# Patient Record
Sex: FEMALE | Race: BLACK OR AFRICAN AMERICAN | NOT HISPANIC OR LATINO | Employment: OTHER | URBAN - METROPOLITAN AREA
[De-identification: names, ages, dates, MRNs, and addresses within clinical notes are randomized per-mention and may not be internally consistent; named-entity substitution may affect disease eponyms.]

---

## 2020-12-24 LAB — BCS RECOMMENDATION EXT: NORMAL

## 2021-05-20 LAB — CRC RECOMMENDATION EXT: NORMAL

## 2022-04-05 ENCOUNTER — OUTSIDE PLACE OF SERVICE (OUTPATIENT)
Dept: NEPHROLOGY | Facility: CLINIC | Age: 72
End: 2022-04-05

## 2022-04-12 ENCOUNTER — OUTSIDE PLACE OF SERVICE (OUTPATIENT)
Dept: NEPHROLOGY | Facility: CLINIC | Age: 72
End: 2022-04-12

## 2022-04-19 ENCOUNTER — OUTSIDE PLACE OF SERVICE (OUTPATIENT)
Dept: NEPHROLOGY | Facility: CLINIC | Age: 72
End: 2022-04-19

## 2022-04-26 ENCOUNTER — OUTSIDE PLACE OF SERVICE (OUTPATIENT)
Dept: NEPHROLOGY | Facility: CLINIC | Age: 72
End: 2022-04-26

## 2022-05-01 PROCEDURE — 90960 PR ESRD SERVICES, PER MONTH, 20+ YR OLD, 4+ VISITS: ICD-10-PCS | Mod: ,,, | Performed by: INTERNAL MEDICINE

## 2022-05-01 PROCEDURE — 90960 ESRD SRV 4 VISITS P MO 20+: CPT | Mod: ,,, | Performed by: INTERNAL MEDICINE

## 2022-05-03 ENCOUNTER — OUTSIDE PLACE OF SERVICE (OUTPATIENT)
Dept: NEPHROLOGY | Facility: CLINIC | Age: 72
End: 2022-05-03
Payer: MEDICARE

## 2022-06-01 ENCOUNTER — OUTSIDE PLACE OF SERVICE (OUTPATIENT)
Dept: NEPHROLOGY | Facility: CLINIC | Age: 72
End: 2022-06-01
Payer: MEDICARE

## 2022-06-01 PROCEDURE — 90961 PR ESRD SERVICES, PER MONTH, 20+ YR OLD, 2-3 VISITS: ICD-10-PCS | Mod: ,,, | Performed by: INTERNAL MEDICINE

## 2022-06-01 PROCEDURE — 90961 ESRD SRV 2-3 VSTS P MO 20+: CPT | Mod: ,,, | Performed by: INTERNAL MEDICINE

## 2022-07-01 ENCOUNTER — OUTSIDE PLACE OF SERVICE (OUTPATIENT)
Dept: NEPHROLOGY | Facility: CLINIC | Age: 72
End: 2022-07-01
Payer: MEDICARE

## 2022-07-01 PROCEDURE — 90960 PR ESRD SERVICES, PER MONTH, 20+ YR OLD, 4+ VISITS: ICD-10-PCS | Mod: ,,, | Performed by: INTERNAL MEDICINE

## 2022-07-01 PROCEDURE — 90960 ESRD SRV 4 VISITS P MO 20+: CPT | Mod: ,,, | Performed by: INTERNAL MEDICINE

## 2022-08-01 ENCOUNTER — OUTSIDE PLACE OF SERVICE (OUTPATIENT)
Dept: NEPHROLOGY | Facility: CLINIC | Age: 72
End: 2022-08-01
Payer: MEDICARE

## 2022-08-01 PROCEDURE — 90960 PR ESRD SERVICES, PER MONTH, 20+ YR OLD, 4+ VISITS: ICD-10-PCS | Mod: ,,, | Performed by: INTERNAL MEDICINE

## 2022-08-01 PROCEDURE — 90960 ESRD SRV 4 VISITS P MO 20+: CPT | Mod: ,,, | Performed by: INTERNAL MEDICINE

## 2022-09-01 ENCOUNTER — OUTSIDE PLACE OF SERVICE (OUTPATIENT)
Dept: NEPHROLOGY | Facility: CLINIC | Age: 72
End: 2022-09-01
Payer: MEDICARE

## 2022-09-01 PROCEDURE — 90961 PR ESRD SERVICES, PER MONTH, 20+ YR OLD, 2-3 VISITS: ICD-10-PCS | Mod: ,,, | Performed by: INTERNAL MEDICINE

## 2022-09-01 PROCEDURE — 90961 ESRD SRV 2-3 VSTS P MO 20+: CPT | Mod: ,,, | Performed by: INTERNAL MEDICINE

## 2022-10-01 ENCOUNTER — OUTSIDE PLACE OF SERVICE (OUTPATIENT)
Dept: NEPHROLOGY | Facility: CLINIC | Age: 72
End: 2022-10-01
Payer: MEDICARE

## 2022-10-01 PROCEDURE — 90960 ESRD SRV 4 VISITS P MO 20+: CPT | Mod: ,,, | Performed by: INTERNAL MEDICINE

## 2022-10-01 PROCEDURE — 90960 PR ESRD SERVICES, PER MONTH, 20+ YR OLD, 4+ VISITS: ICD-10-PCS | Mod: ,,, | Performed by: INTERNAL MEDICINE

## 2022-10-05 ENCOUNTER — PATIENT OUTREACH (OUTPATIENT)
Dept: ADMINISTRATIVE | Facility: HOSPITAL | Age: 72
End: 2022-10-05
Payer: MEDICARE

## 2022-10-05 NOTE — PROGRESS NOTES
Population Health Outreach.Records Received, hyper-linked into chart at this time. The following record(s)  below were uploaded for Health Maintenance .    12/24/2020-MAMMOGRAM  5/20/2021-colonoscopy

## 2022-10-13 RX ORDER — HYDRALAZINE HYDROCHLORIDE 50 MG/1
TABLET, FILM COATED ORAL
Qty: 270 TABLET | Refills: 3 | Status: SHIPPED | OUTPATIENT
Start: 2022-10-13

## 2022-11-01 ENCOUNTER — OUTSIDE PLACE OF SERVICE (OUTPATIENT)
Dept: NEPHROLOGY | Facility: CLINIC | Age: 72
End: 2022-11-01
Payer: MEDICARE

## 2022-11-01 PROCEDURE — 90960 ESRD SRV 4 VISITS P MO 20+: CPT | Mod: ,,, | Performed by: INTERNAL MEDICINE

## 2022-11-01 PROCEDURE — 90960 PR ESRD SERVICES, PER MONTH, 20+ YR OLD, 4+ VISITS: ICD-10-PCS | Mod: ,,, | Performed by: INTERNAL MEDICINE

## 2022-12-01 ENCOUNTER — OUTSIDE PLACE OF SERVICE (OUTPATIENT)
Dept: NEPHROLOGY | Facility: CLINIC | Age: 72
End: 2022-12-01
Payer: MEDICARE

## 2022-12-01 PROCEDURE — 90961 ESRD SRV 2-3 VSTS P MO 20+: CPT | Mod: ,,, | Performed by: INTERNAL MEDICINE

## 2022-12-01 PROCEDURE — 90961 PR ESRD SERVICES, PER MONTH, 20+ YR OLD, 2-3 VISITS: ICD-10-PCS | Mod: ,,, | Performed by: INTERNAL MEDICINE

## 2023-01-01 ENCOUNTER — OUTSIDE PLACE OF SERVICE (OUTPATIENT)
Dept: NEPHROLOGY | Facility: CLINIC | Age: 73
End: 2023-01-01
Payer: MEDICARE

## 2023-01-01 PROCEDURE — 90961 ESRD SRV 2-3 VSTS P MO 20+: CPT | Mod: ,,, | Performed by: INTERNAL MEDICINE

## 2023-01-01 PROCEDURE — 90961 PR ESRD SERVICES, PER MONTH, 20+ YR OLD, 2-3 VISITS: ICD-10-PCS | Mod: ,,, | Performed by: INTERNAL MEDICINE

## 2023-01-09 DIAGNOSIS — T80.90XA: ICD-10-CM

## 2023-01-09 DIAGNOSIS — Z99.2 HEMODIALYSIS PATIENT: Primary | ICD-10-CM

## 2023-02-01 ENCOUNTER — OUTSIDE PLACE OF SERVICE (OUTPATIENT)
Dept: NEPHROLOGY | Facility: CLINIC | Age: 73
End: 2023-02-01
Payer: MEDICARE

## 2023-02-01 PROCEDURE — 90960 ESRD SRV 4 VISITS P MO 20+: CPT | Mod: ,,, | Performed by: INTERNAL MEDICINE

## 2023-02-01 PROCEDURE — 90960 PR ESRD SERVICES, PER MONTH, 20+ YR OLD, 4+ VISITS: ICD-10-PCS | Mod: ,,, | Performed by: INTERNAL MEDICINE

## 2023-03-01 ENCOUNTER — OUTSIDE PLACE OF SERVICE (OUTPATIENT)
Dept: NEPHROLOGY | Facility: CLINIC | Age: 73
End: 2023-03-01
Payer: MEDICARE

## 2023-03-01 PROCEDURE — 90961 ESRD SRV 2-3 VSTS P MO 20+: CPT | Mod: ,,, | Performed by: INTERNAL MEDICINE

## 2023-03-01 PROCEDURE — 90961 PR ESRD SERVICES, PER MONTH, 20+ YR OLD, 2-3 VISITS: ICD-10-PCS | Mod: ,,, | Performed by: INTERNAL MEDICINE

## 2023-04-01 ENCOUNTER — OUTSIDE PLACE OF SERVICE (OUTPATIENT)
Dept: NEPHROLOGY | Facility: CLINIC | Age: 73
End: 2023-04-01
Payer: MEDICARE

## 2023-04-01 PROCEDURE — 90960 PR ESRD SERVICES, PER MONTH, 20+ YR OLD, 4+ VISITS: ICD-10-PCS | Mod: ,,, | Performed by: INTERNAL MEDICINE

## 2023-04-01 PROCEDURE — 90960 ESRD SRV 4 VISITS P MO 20+: CPT | Mod: ,,, | Performed by: INTERNAL MEDICINE

## 2023-05-01 ENCOUNTER — OUTSIDE PLACE OF SERVICE (OUTPATIENT)
Dept: NEPHROLOGY | Facility: CLINIC | Age: 73
End: 2023-05-01
Payer: MEDICARE

## 2023-05-01 PROCEDURE — 90960 PR ESRD SERVICES, PER MONTH, 20+ YR OLD, 4+ VISITS: ICD-10-PCS | Mod: ,,, | Performed by: INTERNAL MEDICINE

## 2023-05-01 PROCEDURE — 90960 ESRD SRV 4 VISITS P MO 20+: CPT | Mod: ,,, | Performed by: INTERNAL MEDICINE

## 2023-06-01 ENCOUNTER — OUTSIDE PLACE OF SERVICE (OUTPATIENT)
Dept: NEPHROLOGY | Facility: CLINIC | Age: 73
End: 2023-06-01
Payer: MEDICARE

## 2023-06-01 PROCEDURE — 90960 PR ESRD SERVICES, PER MONTH, 20+ YR OLD, 4+ VISITS: ICD-10-PCS | Mod: ,,, | Performed by: INTERNAL MEDICINE

## 2023-06-01 PROCEDURE — 90960 ESRD SRV 4 VISITS P MO 20+: CPT | Mod: ,,, | Performed by: INTERNAL MEDICINE

## 2023-07-01 ENCOUNTER — OUTSIDE PLACE OF SERVICE (OUTPATIENT)
Dept: NEPHROLOGY | Facility: CLINIC | Age: 73
End: 2023-07-01
Payer: MEDICARE

## 2023-07-01 PROCEDURE — 90960 PR ESRD SERVICES, PER MONTH, 20+ YR OLD, 4+ VISITS: ICD-10-PCS | Mod: ,,, | Performed by: INTERNAL MEDICINE

## 2023-07-01 PROCEDURE — 90960 ESRD SRV 4 VISITS P MO 20+: CPT | Mod: ,,, | Performed by: INTERNAL MEDICINE

## 2023-07-01 PROCEDURE — 99222 1ST HOSP IP/OBS MODERATE 55: CPT | Mod: ,,, | Performed by: INTERNAL MEDICINE

## 2023-07-01 PROCEDURE — 99222 PR INITIAL HOSPITAL CARE,LEVL II: ICD-10-PCS | Mod: ,,, | Performed by: INTERNAL MEDICINE

## 2023-07-02 PROCEDURE — 99232 SBSQ HOSP IP/OBS MODERATE 35: CPT | Mod: ,,, | Performed by: INTERNAL MEDICINE

## 2023-07-02 PROCEDURE — 99232 PR SUBSEQUENT HOSPITAL CARE,LEVL II: ICD-10-PCS | Mod: ,,, | Performed by: INTERNAL MEDICINE

## 2023-07-03 PROCEDURE — 99232 SBSQ HOSP IP/OBS MODERATE 35: CPT | Mod: ,,, | Performed by: INTERNAL MEDICINE

## 2023-07-03 PROCEDURE — 99232 PR SUBSEQUENT HOSPITAL CARE,LEVL II: ICD-10-PCS | Mod: ,,, | Performed by: INTERNAL MEDICINE

## 2023-08-01 ENCOUNTER — OUTSIDE PLACE OF SERVICE (OUTPATIENT)
Dept: NEPHROLOGY | Facility: CLINIC | Age: 73
End: 2023-08-01
Payer: MEDICARE

## 2023-08-01 PROCEDURE — 90960 ESRD SRV 4 VISITS P MO 20+: CPT | Mod: ,,, | Performed by: INTERNAL MEDICINE

## 2023-08-01 PROCEDURE — 90960 PR ESRD SERVICES, PER MONTH, 20+ YR OLD, 4+ VISITS: ICD-10-PCS | Mod: ,,, | Performed by: INTERNAL MEDICINE

## 2023-09-01 ENCOUNTER — OUTSIDE PLACE OF SERVICE (OUTPATIENT)
Dept: NEPHROLOGY | Facility: CLINIC | Age: 73
End: 2023-09-01
Payer: MEDICARE

## 2023-09-01 PROCEDURE — 90960 PR ESRD SERVICES, PER MONTH, 20+ YR OLD, 4+ VISITS: ICD-10-PCS | Mod: ,,, | Performed by: INTERNAL MEDICINE

## 2023-09-01 PROCEDURE — 90960 ESRD SRV 4 VISITS P MO 20+: CPT | Mod: ,,, | Performed by: INTERNAL MEDICINE

## 2023-10-01 ENCOUNTER — OUTSIDE PLACE OF SERVICE (OUTPATIENT)
Dept: NEPHROLOGY | Facility: CLINIC | Age: 73
End: 2023-10-01
Payer: MEDICARE

## 2023-10-01 PROCEDURE — 90960 PR ESRD SERVICES, PER MONTH, 20+ YR OLD, 4+ VISITS: ICD-10-PCS | Mod: ,,, | Performed by: INTERNAL MEDICINE

## 2023-10-01 PROCEDURE — 90960 ESRD SRV 4 VISITS P MO 20+: CPT | Mod: ,,, | Performed by: INTERNAL MEDICINE

## 2023-11-06 ENCOUNTER — OUTSIDE PLACE OF SERVICE (OUTPATIENT)
Dept: NEPHROLOGY | Facility: CLINIC | Age: 73
End: 2023-11-06
Payer: MEDICARE

## 2023-12-01 PROCEDURE — 90961 ESRD SRV 2-3 VSTS P MO 20+: CPT | Mod: ,,, | Performed by: INTERNAL MEDICINE

## 2023-12-04 ENCOUNTER — OUTSIDE PLACE OF SERVICE (OUTPATIENT)
Dept: NEPHROLOGY | Facility: CLINIC | Age: 73
End: 2023-12-04
Payer: MEDICARE

## 2023-12-15 ENCOUNTER — OUTSIDE PLACE OF SERVICE (OUTPATIENT)
Dept: NEPHROLOGY | Facility: CLINIC | Age: 73
End: 2023-12-15
Payer: MEDICARE

## 2023-12-15 PROCEDURE — 99232 SBSQ HOSP IP/OBS MODERATE 35: CPT | Mod: ,,, | Performed by: INTERNAL MEDICINE
